# Patient Record
Sex: MALE | Race: WHITE | NOT HISPANIC OR LATINO | Employment: STUDENT | URBAN - METROPOLITAN AREA
[De-identification: names, ages, dates, MRNs, and addresses within clinical notes are randomized per-mention and may not be internally consistent; named-entity substitution may affect disease eponyms.]

---
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
## 2024-03-14 ENCOUNTER — OFFICE VISIT (OUTPATIENT)
Dept: ALLERGY | Facility: CLINIC | Age: 19
End: 2024-03-14
Payer: COMMERCIAL

## 2024-03-14 VITALS
SYSTOLIC BLOOD PRESSURE: 132 MMHG | HEART RATE: 73 BPM | BODY MASS INDEX: 28.6 KG/M2 | HEIGHT: 69 IN | DIASTOLIC BLOOD PRESSURE: 60 MMHG | OXYGEN SATURATION: 96 % | WEIGHT: 193.13 LBS

## 2024-03-14 DIAGNOSIS — J45.909 ASTHMA, UNSPECIFIED ASTHMA SEVERITY, UNSPECIFIED WHETHER COMPLICATED, UNSPECIFIED WHETHER PERSISTENT: ICD-10-CM

## 2024-03-14 DIAGNOSIS — L20.9 ATOPIC DERMATITIS, UNSPECIFIED TYPE: Primary | ICD-10-CM

## 2024-03-14 DIAGNOSIS — J30.9 ALLERGIC RHINITIS, UNSPECIFIED SEASONALITY, UNSPECIFIED TRIGGER: ICD-10-CM

## 2024-03-14 PROCEDURE — 99999 PR PBB SHADOW E&M-EST. PATIENT-LVL III: CPT | Mod: PBBFAC,,, | Performed by: STUDENT IN AN ORGANIZED HEALTH CARE EDUCATION/TRAINING PROGRAM

## 2024-03-14 PROCEDURE — 3078F DIAST BP <80 MM HG: CPT | Mod: CPTII,S$GLB,, | Performed by: STUDENT IN AN ORGANIZED HEALTH CARE EDUCATION/TRAINING PROGRAM

## 2024-03-14 PROCEDURE — 3075F SYST BP GE 130 - 139MM HG: CPT | Mod: CPTII,S$GLB,, | Performed by: STUDENT IN AN ORGANIZED HEALTH CARE EDUCATION/TRAINING PROGRAM

## 2024-03-14 PROCEDURE — 1159F MED LIST DOCD IN RCRD: CPT | Mod: CPTII,S$GLB,, | Performed by: STUDENT IN AN ORGANIZED HEALTH CARE EDUCATION/TRAINING PROGRAM

## 2024-03-14 PROCEDURE — 3008F BODY MASS INDEX DOCD: CPT | Mod: CPTII,S$GLB,, | Performed by: STUDENT IN AN ORGANIZED HEALTH CARE EDUCATION/TRAINING PROGRAM

## 2024-03-14 PROCEDURE — 99215 OFFICE O/P EST HI 40 MIN: CPT | Mod: S$GLB,,, | Performed by: STUDENT IN AN ORGANIZED HEALTH CARE EDUCATION/TRAINING PROGRAM

## 2024-03-14 PROCEDURE — 1160F RVW MEDS BY RX/DR IN RCRD: CPT | Mod: CPTII,S$GLB,, | Performed by: STUDENT IN AN ORGANIZED HEALTH CARE EDUCATION/TRAINING PROGRAM

## 2024-03-14 RX ORDER — HYDROCORTISONE 25 MG/G
CREAM TOPICAL 2 TIMES DAILY
Qty: 28 G | Refills: 3 | Status: SHIPPED | OUTPATIENT
Start: 2024-03-14

## 2024-03-14 RX ORDER — TRIAMCINOLONE ACETONIDE 1 MG/G
CREAM TOPICAL
Qty: 453.6 G | Refills: 2 | Status: SHIPPED | OUTPATIENT
Start: 2024-03-14

## 2024-03-14 RX ORDER — CRISABOROLE 20 MG/G
OINTMENT TOPICAL
Qty: 60 G | Refills: 5 | Status: SHIPPED | OUTPATIENT
Start: 2024-03-14

## 2024-03-14 RX ORDER — TRIAMCINOLONE ACETONIDE 1 MG/G
CREAM TOPICAL
Qty: 80 G | Refills: 5 | Status: SHIPPED | OUTPATIENT
Start: 2024-03-14 | End: 2024-03-14

## 2024-03-14 NOTE — PROGRESS NOTES
ALLERGY & IMMUNOLOGY CLINIC - FOLLOW UP     HISTORY OF PRESENT ILLNESS     Patient ID: Paulie Espinal is a 18 y.o. male    CC: atopic dermatitis, asthma    HPI: Paulie Espinal is a 18 y.o. male with a history of autism and allergic rhinitis, following up for asthma and atopic dermatitis.     His eczema is not under control recently. He says there are times when it is better than others, but its been worse than usual over the past 6 months. Flares can occur anywhere but are worse on his face, neck, hands, and arms. Very dry and itchy. On the face, it feels like a sunburn.  He is using gentle cleansers. Eucerin and cerave for moisturizing. He is using triamcinolone prn, which helps when he uses it. He is using it about every 4-5 days. He likes the triamcinolone cream more than the ointment.     Asthma is doing well on the generic air duo. He says he hasn't used the albuterol in the past year.     He says allergies are mostly controlled. Can get a stuffy nose, but not overly bothersome. He is taking cetirizine nightly.   He hasn't felt the need to use his nasal sprays recently.      MEDICAL HISTORY     Vaccines:    Immunization History   Administered Date(s) Administered    DTaP 02/21/2005, 04/28/2005, 06/30/2005, 03/21/2006, 11/04/2009    HIB 02/21/2005, 04/28/2005, 06/30/2005, 01/03/2006    HPV 9-Valent 01/26/2017, 09/01/2017    Hepatitis A, Pediatric/Adolescent, 2 Dose 05/27/2015, 01/26/2017    Hepatitis B 01/04/2005, 02/21/2005, 06/30/2005    IPV 02/21/2005, 04/28/2005, 06/30/2005, 03/21/2006, 11/04/2009    Influenza 11/03/2005, 11/17/2006, 11/12/2007, 11/05/2008, 12/10/2009    Influenza - Quadrivalent 10/21/2017    Influenza - Quadrivalent - PF *Preferred* (6 months and older) 12/02/2015, 01/26/2017, 10/21/2017, 09/25/2019, 09/21/2020    MMR 01/03/2006, 11/04/2009    Meningococcal Conjugate (MCV4P) 05/27/2016, 08/03/2021    Pneumococcal Conjugate - 7 Valent 02/21/2005, 04/28/2005, 06/30/2005, 01/03/2006    Tdap  01/26/2017    Varicella 06/21/2006, 11/04/2009     Medical Hx:   Patient Active Problem List   Diagnosis    Mild intermittent asthma without complication    Autism spectrum disorder, level one    Attention deficit hyperactivity disorder (ADHD), combined type    Overweight (BMI 25.0-29.9)     Surgical Hx:   Past Surgical History:   Procedure Laterality Date    CIRCUMCISION, PRIMARY      WISDOM TOOTH EXTRACTION Bilateral      Medications:   Current Outpatient Medications on File Prior to Visit   Medication Sig Dispense Refill    fluocinonide (LIDEX) 0.05 % external solution AAA scalp qday - bid prn pruritus 60 mL 3    ketoconazole (NIZORAL) 2 % shampoo Wash hair with medicated shampoo at least 2x/week - let sit on scalp at least 5 minutes prior to rinsing 120 mL 5    triamcinolone acetonide 0.1% (KENALOG) 0.1 % cream Apply topically 2 (two) times daily. 80 g 2    triamcinolone acetonide 0.1% (KENALOG) 0.1 % ointment Apply twice daily as needed to eczema flares for up to 10 days at a time. Avoid use on face. 80 g 5    albuterol (PROVENTIL/VENTOLIN HFA) 90 mcg/actuation inhaler Inhale 1 puff into the lungs every evening. (Patient not taking: Reported on 3/14/2024) 18 g 0    azelastine 205.5 mcg (0.15 %) Spry 2 sprays by Each Nostril route once daily.       fluticasone propion-salmeteroL 113-14 mcg/actuation AePB Inhale 1 puff into the lungs 2 (two) times daily.      fluticasone propionate (FLONASE) 50 mcg/actuation nasal spray 1 spray by Each Nostril route once daily.       No current facility-administered medications on file prior to visit.     Drug Allergies:   Review of patient's allergies indicates:   Allergen Reactions    Singulair  [montelukast]      Other reaction(s): weird behavioral issues     Social Hx:   Social History     Tobacco Use    Smoking status: Never     Passive exposure: Past    Smokeless tobacco: Never   Substance Use Topics    Alcohol use: No    Drug use: No     Additional History Obtained at  "Initial Visit:  H/o Asthma: endorses  H/o Rhinitis: endorses  Env/Occ:   Pets: 5 dogs and 3 cats. 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  Infection Hx: Denies frequent infections requiring antibiotics.      PHYSICAL EXAM     VS: /60 (BP Location: Left arm, Patient Position: Sitting, BP Method: Medium (Manual))   Pulse 73   Ht 5' 9" (1.753 m)   Wt 87.6 kg (193 lb 2 oz)   SpO2 96%   BMI 28.52 kg/m²   GENERAL: Alert, NAD, well-appearing  EYES: EOMI, no conjunctival injection, no discharge, no infraorbital shiners  NOSE: NT 2 + B/L, no stringing mucus, no polyps visualized  ORAL: MMM, no ulcers, no thrush  LUNGS: CTAB, no w/r/c, no increased WOB  HEART: RRR, normal S1/S2, no m/g/r  DERM: Erythema and xerosis with some scaling covering majority of face and neck; large patches of erythema, xerosis, and scaling covering at least half the surface area of his arms; excoriations on upper arms; erythema, xerosis, and scabs on dorsum of bilateral hands; scaling on palms of bilateral hands with fissure on right thenar eminence   NEURO: normal speech, normal gait, no facial asymmetry     LABORATORY STUDIES     Component      Latest Ref Rng & Units 11/27/2021 5/30/2015 5/28/2015   WBC      4.50 - 13.50 K/uL 7.95 8.74 10.15   RBC      4.50 - 5.30 M/uL 5.39 (H) 4.90 5.09   Hemoglobin      13.0 - 16.0 g/dL 15.9 13.3 13.6   Hematocrit      37.0 - 47.0 % 50.2 (H) 40.1 40.0   MCV      78 - 98 fL 93 82 79   MCH      25.0 - 35.0 pg 29.5 27.1 26.7   MCHC      31.0 - 37.0 g/dL 31.7 33.2 34.0   RDW      11.5 - 14.5 % 12.9 13.5 13.6   Platelets      150 - 450 K/uL 250 329 85 (L)   MPV      9.2 - 12.9 fL 10.0 9.4 10.3   Immature Granulocytes      0.0 - 0.5 % 0.4     Gran # (ANC)      1.8 - 8.0 K/uL 4.3 4.3 4.5   Immature Grans (Abs)      0.00 - " 0.04 K/uL 0.03     Lymph #      1.2 - 5.8 K/uL 2.6 2.5 3.5   Mono #      0.2 - 0.8 K/uL 0.8 0.9 (H) 1.0 (H)   Eos #      0.0 - 0.4 K/uL 0.2 1.0 (H) 1.1 (H)   Baso #      0.01 - 0.05 K/uL 0.04 0.05 0.06   Differential Method       Automated Automated Automated   Sodium      136 - 145 mmol/L 140  139   Potassium      3.5 - 5.1 mmol/L 4.3  4.1   Chloride      95 - 110 mmol/L 103  108   CO2      23 - 29 mmol/L 27  21 (L)   Glucose      70 - 110 mg/dL 95  85   BUN      5 - 18 mg/dL 10  15   Creatinine      0.5 - 1.4 mg/dL 0.8  0.6   Calcium      8.7 - 10.5 mg/dL 10.2  9.7   PROTEIN TOTAL      6.0 - 8.4 g/dL 7.5  7.6   Albumin      3.2 - 4.7 g/dL 4.1  3.9   BILIRUBIN TOTAL      0.1 - 1.0 mg/dL 0.5  0.2   Alkaline Phosphatase      89 - 365 U/L 88 (L)  243   AST      10 - 40 U/L 14  24   ALT      10 - 44 U/L 25  32   Hemoglobin A1C External      4.0 - 5.6 % 5.1     TSH      0.400 - 5.000 uIU/mL   0.448   HIV 1/2 Ag/Ab      Negative Negative        ALLERGEN TESTING     Skin Prick:   Records previously requested, but not received.      PULMONARY FUNCTION TESTING     Date 11/20/23:  FVC:         108%ref -> - 1%  FEV1:         105%ref -> + 1%  FEV1/FVC: 83%  FEF 25-75: 99%ref  Interpretation: Spirometry is normal. Spirometry remains unimproved following bronchodilator.      IMAGING & OTHER DIAGNOSTICS     CXR 3/20/11:  Clinical history: Shortness of breath   The heart and mediastinum structures are within normal limits.   Prominent lung markings are seen at the parahilar regions bilaterally.     There is no evidence of focal infiltration.  There is no evidence of   pleural effusion or pneumothorax.  The bones appear intact.   Impression: Bronchitis suspected.      ASSESSMENT & PLAN     Paulie Espinal is a 18 y.o. male with     # Severe atopic dermatitis: Symptoms have been poorly controlled over the past 6 months. Flares can occur anywhere but are worst on his face, neck, hands, and arms. He is using triamcinolone sparingly  (about once every 4-5 days). Of note, he generally prefers creams over ointments.  -continue triamcinolone 0.1% cream BID prn. Recommend that he use this more frequently. Discussed the balance between using it when needed vs over use.  -start hydrocortisone 2.5 cream BID prn for flares on face. Counseled against over use.  -start eucrisa ointment BID prn for flares on face or body.   -continue frequent moisturization.  -if symptoms still not controlled, will further discuss the option of dupixent at next visit.     # Asthma: Symptoms under good control, and he hasn't needed albuterol since restarting airduo in 6/2023. He didn't tolerate montelukast as a child (behavioral issus), so will continue to avoid. Spirometry was normal (on airduo).  -continue generic airduo 113-14 mcg 1 puff bid.  -continue albuterol prn.    # Allergic rhinitis: He was on allergen immunotherapy injections for about 6 years (~9016-0908), prescribed through his allergist in [place], but given to him by his mother at home. He found AIT helpful (symptoms now more intermittent and easy to control).  -continue cetirizine nightly.   -he has flonase and azelastine nasal sprays to use prn (but hasn't needed them recently).    Follow up: 1-2 months     I spent a total of 45 minutes on the day of the visit.  This includes face to face time and non-face to face time preparing to see the patient, obtaining and/or reviewing separately obtained history, documenting clinical information in the electronic or other health record.    Ciara Kennedy MD  Allergy/Immunology

## 2024-05-02 ENCOUNTER — OFFICE VISIT (OUTPATIENT)
Dept: ALLERGY | Facility: CLINIC | Age: 19
End: 2024-05-02
Payer: COMMERCIAL

## 2024-05-02 VITALS — HEIGHT: 69 IN | BODY MASS INDEX: 29.36 KG/M2 | WEIGHT: 198.19 LBS

## 2024-05-02 DIAGNOSIS — L20.9 ATOPIC DERMATITIS, UNSPECIFIED TYPE: Primary | ICD-10-CM

## 2024-05-02 DIAGNOSIS — J45.909 ASTHMA, UNSPECIFIED ASTHMA SEVERITY, UNSPECIFIED WHETHER COMPLICATED, UNSPECIFIED WHETHER PERSISTENT: ICD-10-CM

## 2024-05-02 DIAGNOSIS — J30.9 ALLERGIC RHINITIS, UNSPECIFIED SEASONALITY, UNSPECIFIED TRIGGER: ICD-10-CM

## 2024-05-02 PROCEDURE — 99999 PR PBB SHADOW E&M-EST. PATIENT-LVL III: CPT | Mod: PBBFAC,,, | Performed by: STUDENT IN AN ORGANIZED HEALTH CARE EDUCATION/TRAINING PROGRAM

## 2024-05-02 PROCEDURE — 99214 OFFICE O/P EST MOD 30 MIN: CPT | Mod: S$GLB,,, | Performed by: STUDENT IN AN ORGANIZED HEALTH CARE EDUCATION/TRAINING PROGRAM

## 2024-05-02 PROCEDURE — 1159F MED LIST DOCD IN RCRD: CPT | Mod: CPTII,S$GLB,, | Performed by: STUDENT IN AN ORGANIZED HEALTH CARE EDUCATION/TRAINING PROGRAM

## 2024-05-02 PROCEDURE — 3008F BODY MASS INDEX DOCD: CPT | Mod: CPTII,S$GLB,, | Performed by: STUDENT IN AN ORGANIZED HEALTH CARE EDUCATION/TRAINING PROGRAM

## 2024-05-02 PROCEDURE — 1160F RVW MEDS BY RX/DR IN RCRD: CPT | Mod: CPTII,S$GLB,, | Performed by: STUDENT IN AN ORGANIZED HEALTH CARE EDUCATION/TRAINING PROGRAM

## 2024-05-02 RX ORDER — ALBUTEROL SULFATE 90 UG/1
1 AEROSOL, METERED RESPIRATORY (INHALATION) EVERY 6 HOURS PRN
Qty: 18 G | Refills: 2 | Status: SHIPPED | OUTPATIENT
Start: 2024-05-02

## 2024-05-02 NOTE — PROGRESS NOTES
ALLERGY & IMMUNOLOGY CLINIC - FOLLOW UP     HISTORY OF PRESENT ILLNESS     Patient ID: Paulie Espinal is a 18 y.o. male    CC: atopic dermatitis, asthma     HPI: Paulie Espinal is a 18 y.o. male with a history of autism and allergic rhinitis, following up for asthma and atopic dermatitis.     He says after last visit, started to use triamcinolone more consistently for about 1.5 weeks, which helped a lot. Symptoms then returned, but not as bad as last visit. Still pretty severe on his hands though. He is washing his hands pretty frequently at work.   He says he didn't get a chance to  the eucrisa, but he thinks the pharmacy did fill it.   He currently doesn't feel like he needs to start dupixent.     He reports his asthma is doing fine. Using his airduo. Hasn't been needing albuterol.     He says his rhinitis is also under control. Using zyrtec nightly. Hasn't really been needing his nasal sprays.      MEDICAL HISTORY     Vaccines:    Immunization History   Administered Date(s) Administered    DTaP 02/21/2005, 04/28/2005, 06/30/2005, 03/21/2006, 11/04/2009    HIB 02/21/2005, 04/28/2005, 06/30/2005, 01/03/2006    HPV 9-Valent 01/26/2017, 09/01/2017    Hepatitis A, Pediatric/Adolescent, 2 Dose 05/27/2015, 01/26/2017    Hepatitis B 01/04/2005, 02/21/2005, 06/30/2005    IPV 02/21/2005, 04/28/2005, 06/30/2005, 03/21/2006, 11/04/2009    Influenza 11/03/2005, 11/17/2006, 11/12/2007, 11/05/2008, 12/10/2009    Influenza - Quadrivalent 10/21/2017    Influenza - Quadrivalent - PF *Preferred* (6 months and older) 12/02/2015, 01/26/2017, 10/21/2017, 09/25/2019, 09/21/2020    MMR 01/03/2006, 11/04/2009    Meningococcal Conjugate (MCV4P) 05/27/2016, 08/03/2021    Pneumococcal Conjugate - 7 Valent 02/21/2005, 04/28/2005, 06/30/2005, 01/03/2006    Tdap 01/26/2017    Varicella 06/21/2006, 11/04/2009     Medical Hx:   Patient Active Problem List   Diagnosis    Mild intermittent asthma without complication    Autism spectrum  disorder, level one    Attention deficit hyperactivity disorder (ADHD), combined type    Overweight (BMI 25.0-29.9)     Surgical Hx:   Past Surgical History:   Procedure Laterality Date    CIRCUMCISION, PRIMARY      WISDOM TOOTH EXTRACTION Bilateral      Medications:   Current Outpatient Medications on File Prior to Visit   Medication Sig Dispense Refill    albuterol (PROVENTIL/VENTOLIN HFA) 90 mcg/actuation inhaler Inhale 1 puff into the lungs every evening. 18 g 0    azelastine 205.5 mcg (0.15 %) Spry 2 sprays by Each Nostril route once daily.       crisaborole (EUCRISA) 2 % Oint Apply twice daily as needed to areas of eczema flares. 60 g 5    fluocinonide (LIDEX) 0.05 % external solution AAA scalp qday - bid prn pruritus 60 mL 3    fluticasone propionate (FLONASE) 50 mcg/actuation nasal spray 1 spray by Each Nostril route once daily.      hydrocortisone 2.5 % cream Apply topically 2 (two) times daily. Apply twice daily as needed to eczema flares for up to 10 days at a time. 28 g 3    ketoconazole (NIZORAL) 2 % shampoo Wash hair with medicated shampoo at least 2x/week - let sit on scalp at least 5 minutes prior to rinsing 120 mL 5    triamcinolone acetonide 0.1% (KENALOG) 0.1 % cream Apply twice daily as needed to eczema flares for up to 10 days at a time. Avoid use on face. 453.6 g 2    triamcinolone acetonide 0.1% (KENALOG) 0.1 % ointment Apply twice daily as needed to eczema flares for up to 10 days at a time. Avoid use on face. 80 g 5    [DISCONTINUED] fluticasone propion-salmeteroL 113-14 mcg/actuation AePB Inhale 1 puff into the lungs 2 (two) times daily.       No current facility-administered medications on file prior to visit.     Drug Allergies:   Review of patient's allergies indicates:   Allergen Reactions    Singulair  [montelukast]      Other reaction(s): weird behavioral issues     Social Hx:   Social History     Tobacco Use    Smoking status: Never     Passive exposure: Past    Smokeless tobacco:  "Never   Substance Use Topics    Alcohol use: No    Drug use: No     Additional History Obtained at Initial Visit:  H/o Asthma: endorses  H/o Rhinitis: endorses  Env/Occ:   Pets: 5 dogs and 3 cats. 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  Infection Hx: Denies frequent infections requiring antibiotics.      PHYSICAL EXAM     VS: Ht 5' 9" (1.753 m)   Wt 89.9 kg (198 lb 3.1 oz)   BMI 29.27 kg/m²   GENERAL: Alert, NAD, well-appearing  EYES: EOMI, no conjunctival injection, no discharge, no infraorbital shiners  NOSE: NT 2 + B/L, no stringing mucus, no polyps visualized  ORAL: MMM, no ulcers, no thrush  LUNGS: CTAB, no w/r/c, no increased WOB  HEART: RRR, normal S1/S2, no m/g/r  DERM: Scaling and erythema on palms on bilateral hands, but more so on right with skin breaks and fissures on palm of right hand and fingers; mild xerosis on upper arms bilaterally.  NEURO: normal speech, normal gait, no facial asymmetry     LABORATORY STUDIES     Component      Latest Ref Rng & Units 11/27/2021 5/30/2015 5/28/2015   WBC      4.50 - 13.50 K/uL 7.95 8.74 10.15   RBC      4.50 - 5.30 M/uL 5.39 (H) 4.90 5.09   Hemoglobin      13.0 - 16.0 g/dL 15.9 13.3 13.6   Hematocrit      37.0 - 47.0 % 50.2 (H) 40.1 40.0   MCV      78 - 98 fL 93 82 79   MCH      25.0 - 35.0 pg 29.5 27.1 26.7   MCHC      31.0 - 37.0 g/dL 31.7 33.2 34.0   RDW      11.5 - 14.5 % 12.9 13.5 13.6   Platelets      150 - 450 K/uL 250 329 85 (L)   MPV      9.2 - 12.9 fL 10.0 9.4 10.3   Immature Granulocytes      0.0 - 0.5 % 0.4     Gran # (ANC)      1.8 - 8.0 K/uL 4.3 4.3 4.5   Immature Grans (Abs)      0.00 - 0.04 K/uL 0.03     Lymph #      1.2 - 5.8 K/uL 2.6 2.5 3.5   Mono #      0.2 - 0.8 K/uL 0.8 0.9 (H) 1.0 (H)   Eos #      0.0 - 0.4 K/uL 0.2 1.0 (H) 1.1 (H)   Baso #      0.01 - " 0.05 K/uL 0.04 0.05 0.06   Differential Method       Automated Automated Automated   Sodium      136 - 145 mmol/L 140  139   Potassium      3.5 - 5.1 mmol/L 4.3  4.1   Chloride      95 - 110 mmol/L 103  108   CO2      23 - 29 mmol/L 27  21 (L)   Glucose      70 - 110 mg/dL 95  85   BUN      5 - 18 mg/dL 10  15   Creatinine      0.5 - 1.4 mg/dL 0.8  0.6   Calcium      8.7 - 10.5 mg/dL 10.2  9.7   PROTEIN TOTAL      6.0 - 8.4 g/dL 7.5  7.6   Albumin      3.2 - 4.7 g/dL 4.1  3.9   BILIRUBIN TOTAL      0.1 - 1.0 mg/dL 0.5  0.2   Alkaline Phosphatase      89 - 365 U/L 88 (L)  243   AST      10 - 40 U/L 14  24   ALT      10 - 44 U/L 25  32   Hemoglobin A1C External      4.0 - 5.6 % 5.1     TSH      0.400 - 5.000 uIU/mL   0.448   HIV 1/2 Ag/Ab      Negative Negative        ALLERGEN TESTING     Skin Prick:   Records previously requested, but not received.      PULMONARY FUNCTION TESTING     Date 11/20/23:  FVC:         108%ref -> - 1%  FEV1:         105%ref -> + 1%  FEV1/FVC: 83%  FEF 25-75: 99%ref  Interpretation: Spirometry is normal. Spirometry remains unimproved following bronchodilator.      IMAGING & OTHER DIAGNOSTICS     CXR 3/20/11:  Clinical history: Shortness of breath   The heart and mediastinum structures are within normal limits.   Prominent lung markings are seen at the parahilar regions bilaterally.     There is no evidence of focal infiltration.  There is no evidence of   pleural effusion or pneumothorax.  The bones appear intact.   Impression: Bronchitis suspected.      ASSESSMENT & PLAN     Paulie Espinal is a 18 y.o. male with     # Atopic dermatitis: At visit in 3/2024, symptoms were severe and poorly controlled. He had improvement with increasing use of his triamcinolone, but currently with significant symptoms on his hands. Says he washes his hands a lot at work. Of note, he generally prefers creams over ointments.  -continue triamcinolone 0.1% cream BID prn.   -start eucrisa ointment BID prn for  flares on face or body (says he hasn't gotten a chance to pick this up from pharmacy yet).    -continue frequent moisturization.  -for his hands, recommend he use the triamcinolone at least daily on his hands for 1-2 weeks then prn after that. Advised that he always moisturize his hands with an unscented thick cream or ointment after washing them.   -if symptoms worsen again, will further discuss the option of dupixent.     # Asthma: Symptoms under good control, and he hasn't needed albuterol since restarting airduo in 6/2023. He didn't tolerate montelukast as a child (behavioral issus), so will continue to avoid. Spirometry was normal (on airduo).  -continue generic airduo 113-14 mcg 1 puff bid.  -continue albuterol prn.    # Allergic rhinitis: He was on allergen immunotherapy injections for about 6 years (~[id number]), prescribed through his allergist in [place], but given to him by his mother at home. He found AIT helpful (symptoms now more intermittent and easy to control).  -continue cetirizine nightly.   -he has flonase and azelastine nasal sprays to use prn (but hasn't needed them recently).      Follow up: 6 months or sooner if needed    I spent a total of 35 minutes on the day of the visit.  This includes face to face time and non-face to face time preparing to see the patient, obtaining and/or reviewing separately obtained history, documenting clinical information in the electronic or other health record.    Ciara Kennedy MD  Allergy/Immunology

## 2024-05-08 RX ORDER — FLUTICASONE PROPIONATE AND SALMETEROL 113; 14 UG/1; UG/1
1 POWDER, METERED RESPIRATORY (INHALATION) 2 TIMES DAILY
Qty: 1 EACH | Refills: 11 | Status: SHIPPED | OUTPATIENT
Start: 2024-05-08 | End: 2024-05-08

## 2024-06-13 ENCOUNTER — OFFICE VISIT (OUTPATIENT)
Dept: OPTOMETRY | Facility: CLINIC | Age: 19
End: 2024-06-13
Payer: COMMERCIAL

## 2024-06-13 DIAGNOSIS — H52.13 MYOPIA OF BOTH EYES: Primary | ICD-10-CM

## 2024-06-13 PROCEDURE — 92015 DETERMINE REFRACTIVE STATE: CPT | Mod: S$GLB,,, | Performed by: OPTOMETRIST

## 2024-06-13 PROCEDURE — 99999 PR PBB SHADOW E&M-EST. PATIENT-LVL II: CPT | Mod: PBBFAC,,, | Performed by: OPTOMETRIST

## 2024-06-13 PROCEDURE — 1159F MED LIST DOCD IN RCRD: CPT | Mod: CPTII,S$GLB,, | Performed by: OPTOMETRIST

## 2024-06-13 PROCEDURE — 92014 COMPRE OPH EXAM EST PT 1/>: CPT | Mod: S$GLB,,, | Performed by: OPTOMETRIST

## 2024-06-13 NOTE — PROGRESS NOTES
HPI    Patient is here today for a routine eye exam. Last seen on 12/27/2022. No   visual complaints at this time. Patient states he sees floaters when he   looks up in the [place]orian. No pain or discomfort. Itching OU associated with   seasonal allergies.    Eye Meds: None  Past Ocular Sx: None  Last edited by Do Daniel on 6/13/2024  2:34 PM.            Assessment /Plan     For exam results, see Encounter Report.    Myopia of both eyes  Eyeglass Final Rx       Eyeglass Final Rx         Sphere [employer]ylinder [place] [facility]    Right -5.25 +1.00 095 20/20    Left -5.25 +0.50 125 20/20      Type: SVL    Expiration Date: 6/13/2025                      RTC 1 yr

## 2024-10-26 ENCOUNTER — PATIENT MESSAGE (OUTPATIENT)
Dept: ALLERGY | Facility: CLINIC | Age: 19
End: 2024-10-26
Payer: COMMERCIAL

## 2024-10-28 NOTE — TELEPHONE ENCOUNTER
Spoke with Pharmacy Tech at [facility] and was informed that Airduo is not covered and alternatives are Advair HFA or Breo Elipta.

## 2024-11-07 ENCOUNTER — OFFICE VISIT (OUTPATIENT)
Dept: ALLERGY | Facility: CLINIC | Age: 19
End: 2024-11-07
Payer: COMMERCIAL

## 2024-11-07 VITALS — WEIGHT: 202.19 LBS | HEIGHT: 69 IN | BODY MASS INDEX: 29.95 KG/M2

## 2024-11-07 DIAGNOSIS — L20.9 ATOPIC DERMATITIS, UNSPECIFIED TYPE: Primary | ICD-10-CM

## 2024-11-07 DIAGNOSIS — J30.9 ALLERGIC RHINITIS, UNSPECIFIED SEASONALITY, UNSPECIFIED TRIGGER: ICD-10-CM

## 2024-11-07 DIAGNOSIS — J45.909 ASTHMA, UNSPECIFIED ASTHMA SEVERITY, UNSPECIFIED WHETHER COMPLICATED, UNSPECIFIED WHETHER PERSISTENT: ICD-10-CM

## 2024-11-07 PROCEDURE — 99999 PR PBB SHADOW E&M-EST. PATIENT-LVL III: CPT | Mod: PBBFAC,,, | Performed by: STUDENT IN AN ORGANIZED HEALTH CARE EDUCATION/TRAINING PROGRAM

## 2024-11-07 RX ORDER — CRISABOROLE 20 MG/G
OINTMENT TOPICAL
Qty: 60 G | Refills: 5 | Status: SHIPPED | OUTPATIENT
Start: 2024-11-07

## 2024-11-07 NOTE — PROGRESS NOTES
ALLERGY & IMMUNOLOGY CLINIC - FOLLOW UP     HISTORY OF PRESENT ILLNESS     Patient ID: Paulie Espinal is a 18 y.o. male    CC: atopic dermatitis, asthma     HPI: Paulie Espinal is a 18 y.o. male with a history of autism and allergic rhinitis, following up for asthma and atopic dermatitis.     He reports his asthma is well controlled. He takes his airduo, only needing once per day. He hasn't needed albuterol recently.     He says eczema is better than it was in March, but still with bothersome symptoms. Gets symptoms on his hands, arms, face, and neck.   He tried to get the eucrisa ointment, but says he never got [facility] to fill it. Now using [facility].  He is using the triamcinolone as needed.  He is moisturizing hands after hand washing.     He reports his rhinitis is doing about the same, not too bad. He is no longer taking zyrtec nightly. He didn't think it was helping. He takes benadryl prn on occasion. He thinks it works better than the zyrtec.   He hasn't been using his nasal sprays.      MEDICAL HISTORY     Vaccines:    Immunization History   Administered Date(s) Administered    DTaP 02/21/2005, 04/28/2005, 06/30/2005, 03/21/2006, 11/04/2009    HIB 02/21/2005, 04/28/2005, 06/30/2005, 01/03/2006    HPV 9-Valent 01/26/2017, 09/01/2017    Hepatitis A, Pediatric/Adolescent, 2 Dose 05/27/2015, 01/26/2017    Hepatitis B 01/04/2005, 02/21/2005, 06/30/2005    IPV 02/21/2005, 04/28/2005, 06/30/2005, 03/21/2006, 11/04/2009    Influenza 11/03/2005, 11/17/2006, 11/12/2007, 11/05/2008, 12/10/2009    Influenza - Quadrivalent 10/21/2017    Influenza - Quadrivalent - PF *Preferred* (6 months and older) 12/02/2015, 01/26/2017, 10/21/2017, 09/25/2019, 09/21/2020    MMR 01/03/2006, 11/04/2009    Meningococcal Conjugate (MCV4P) 05/27/2016, 08/03/2021    Pneumococcal Conjugate - 7 Valent 02/21/2005, 04/28/2005, 06/30/2005, 01/03/2006    Tdap 01/26/2017    Varicella 06/21/2006, 11/04/2009     Medical Hx:   Patient Active Problem List    Diagnosis    Mild intermittent asthma without complication    Autism spectrum disorder, level one    Attention deficit hyperactivity disorder (ADHD), combined type    Overweight (BMI 25.0-29.9)     Surgical Hx:   Past Surgical History:   Procedure Laterality Date    CIRCUMCISION, PRIMARY      WISDOM TOOTH EXTRACTION Bilateral      Medications:   Current Outpatient Medications on File Prior to Visit   Medication Sig Dispense Refill    albuterol (PROVENTIL/VENTOLIN HFA) 90 mcg/actuation inhaler Inhale 1 puff into the lungs every 6 (six) hours as needed for Wheezing or Shortness of Breath. 18 g 2    crisaborole (EUCRISA) 2 % Oint Apply twice daily as needed to areas of eczema flares. 60 g 5    fluticasone propion-salmeteroL 113 mcg-14 mcg/actuation aebs Inhale 1 puff into the lungs 2 (two) times daily. 1 each 11    hydrocortisone 2.5 % cream Apply topically 2 (two) times daily. Apply twice daily as needed to eczema flares for up to 10 days at a time. 28 g 3    triamcinolone acetonide 0.1% (KENALOG) 0.1 % cream Apply twice daily as needed to eczema flares for up to 10 days at a time. Avoid use on face. 453.6 g 2    triamcinolone acetonide 0.1% (KENALOG) 0.1 % ointment Apply twice daily as needed to eczema flares for up to 10 days at a time. Avoid use on face. 80 g 5    azelastine 205.5 mcg (0.15 %) Spry 2 sprays by Each Nostril route once daily.  (Patient not taking: Reported on 11/7/2024)      fluocinonide (LIDEX) 0.05 % external solution AAA scalp qday - bid prn pruritus (Patient not taking: Reported on 11/7/2024) 60 mL 3    fluticasone propionate (FLONASE) 50 mcg/actuation nasal spray 1 spray by Each Nostril route once daily. (Patient not taking: Reported on 11/7/2024)      ketoconazole (NIZORAL) 2 % shampoo Wash hair with medicated shampoo at least 2x/week - let sit on scalp at least 5 minutes prior to rinsing (Patient not taking: Reported on 11/7/2024) 120 mL 5     No current facility-administered medications on  "file prior to visit.     Drug Allergies:   Review of patient's allergies indicates:   Allergen Reactions    Singulair  [montelukast]      Other reaction(s): weird behavioral issues     Social Hx:   Social History     Tobacco Use    Smoking status: Never     Passive exposure: Past    Smokeless tobacco: Never   Substance Use Topics    Alcohol use: No    Drug use: No     Additional History Obtained at Initial Visit:  H/o Asthma: endorses  H/o Rhinitis: endorses  Env/Occ:   Pets: 5 dogs and 3 cats. They used to trigger symptoms (prior to allergy shots), but now they don't   Occupation: he works as a  in a grocery store. He is in senior year at [employer], plans on studying accounting (plans on staying in [place]).  Infection Hx: Denies frequent infections requiring antibiotics.      PHYSICAL EXAM     VS: Ht 5' 9" (1.753 m)   Wt 91.7 kg (202 lb 2.6 oz)   BMI 29.85 kg/m²   GENERAL: Alert, NAD, well-appearing  EYES: EOMI, no conjunctival injection, no discharge, no infraorbital shiners  NOSE: NT 2-3+ B/L, + white mucus in right nares, no polyps visualized  ORAL: MMM, no ulcers, no thrush  LUNGS: CTAB, no w/r/c, no increased WOB  HEART: RRR, normal S1/S2, no m/g/r  DERM: Scaling and erythema of bilateral hands with skin breaks and fissures on palms and fingers; excoriations on right forearm; erythema and scaling on right neck  NEURO: normal speech, normal gait, no facial asymmetry     LABORATORY STUDIES     Component      Latest Ref Rng & Units 11/27/2021 5/30/2015 5/28/2015   WBC      4.50 - 13.50 K/uL 7.95 8.74 10.15   RBC      4.50 - 5.30 M/uL 5.39 (H) 4.90 5.09   Hemoglobin      13.0 - 16.0 g/dL 15.9 13.3 13.6   Hematocrit      37.0 - 47.0 % 50.2 (H) 40.1 40.0   MCV      78 - 98 fL 93 82 79   MCH      25.0 - 35.0 pg 29.5 27.1 26.7   MCHC      31.0 - 37.0 g/dL 31.7 33.2 34.0   RDW      11.5 - 14.5 % 12.9 13.5 13.6   Platelets      150 - 450 K/uL 250 329 85 (L)   MPV      9.2 - 12.9 fL 10.0 9.4 10.3 "   Immature Granulocytes      0.0 - 0.5 % 0.4     Gran # (ANC)      1.8 - 8.0 K/uL 4.3 4.3 4.5   Immature Grans (Abs)      0.00 - 0.04 K/uL 0.03     Lymph #      1.2 - 5.8 K/uL 2.6 2.5 3.5   Mono #      0.2 - 0.8 K/uL 0.8 0.9 (H) 1.0 (H)   Eos #      0.0 - 0.4 K/uL 0.2 1.0 (H) 1.1 (H)   Baso #      0.01 - 0.05 K/uL 0.04 0.05 0.06   Differential Method       Automated Automated Automated   Sodium      136 - 145 mmol/L 140  139   Potassium      3.5 - 5.1 mmol/L 4.3  4.1   Chloride      95 - 110 mmol/L 103  108   CO2      23 - 29 mmol/L 27  21 (L)   Glucose      70 - 110 mg/dL 95  85   BUN      5 - 18 mg/dL 10  15   Creatinine      0.5 - 1.4 mg/dL 0.8  0.6   Calcium      8.7 - 10.5 mg/dL 10.2  9.7   PROTEIN TOTAL      6.0 - 8.4 g/dL 7.5  7.6   Albumin      3.2 - 4.7 g/dL 4.1  3.9   BILIRUBIN TOTAL      0.1 - 1.0 mg/dL 0.5  0.2   Alkaline Phosphatase      89 - 365 U/L 88 (L)  243   AST      10 - 40 U/L 14  24   ALT      10 - 44 U/L 25  32   Hemoglobin A1C External      4.0 - 5.6 % 5.1     TSH      0.400 - 5.000 uIU/mL   0.448   HIV 1/2 Ag/Ab      Negative Negative        ALLERGEN TESTING     Skin Prick:   Records previously requested, but not received.      PULMONARY FUNCTION TESTING     Date 11/20/23:  FVC:         108%ref -> - 1%  FEV1:         105%ref -> + 1%  FEV1/FVC: 83%  FEF 25-75: 99%ref  Interpretation: Spirometry is normal. Spirometry remains unimproved following bronchodilator.      IMAGING & OTHER DIAGNOSTICS     CXR 3/20/11:  Clinical history: Shortness of breath   The heart and mediastinum structures are within normal limits.   Prominent lung markings are seen at the parahilar regions bilaterally.     There is no evidence of focal infiltration.  There is no evidence of   pleural effusion or pneumothorax.  The bones appear intact.   Impression: Bronchitis suspected.      ASSESSMENT & PLAN     Paulie Espinal is a 18 y.o. male with     # Atopic dermatitis: At visit in 3/2024, symptoms were severe and poorly  controlled. He had improvement with increasing use of his triamcinolone, but still not optimally controlled. Symptoms on hands are severe. Of note, he generally prefers creams over ointments.  -continue triamcinolone 0.1% cream BID prn.   -start eucrisa ointment BID prn for flares on face or body (wasn't able to get it from old pharmacy; prescribed to his new pharmacy).    -continue frequent moisturization, and continue to moisturize hands after washing.   -if symptoms not controlled with addition of eucrisa, will further discuss the option of dupixent at next visit in 1-2 months.     # Asthma: Symptoms under good control, and he hasn't needed albuterol recently. He didn't tolerate montelukast as a child (behavioral issus), so will continue to avoid. Spirometry was normal (on airduo).  -continue generic airduo 113-14 mcg 1 puff daily (prescribed BID but he reports good control with once daily use).  -continue albuterol prn.    # Allergic rhinitis: He was on allergen immunotherapy injections for about 6 years (~[id number]), prescribed through his allergist in [place], but given to him by his mother at home. He found AIT helpful (symptoms now more intermittent and easy to control). He is no longer using flonase or azelastine nasal sprays. He stopped cetirizine because he didn't find it helpful, now using benadryl prn.  -advised that instead of benadryl, he can take higher dose of cetirizine (20 mg) prn.       Follow up: 1-2 months     I spent a total of 30 minutes on the day of the visit.  This includes face to face time and non-face to face time preparing to see the patient (eg, review of tests), obtaining and/or reviewing separately obtained history, documenting clinical information in the electronic or other health record.    Ciara Kennedy MD  Allergy/Immunology

## 2024-11-21 ENCOUNTER — TELEPHONE (OUTPATIENT)
Dept: FAMILY MEDICINE | Facility: CLINIC | Age: 19
End: 2024-11-21
Payer: COMMERCIAL

## 2024-11-21 ENCOUNTER — PATIENT MESSAGE (OUTPATIENT)
Dept: FAMILY MEDICINE | Facility: CLINIC | Age: 19
End: 2024-11-21
Payer: COMMERCIAL

## 2024-11-21 NOTE — TELEPHONE ENCOUNTER
Spoke with patient. Patient declines scheduling and appointment with NP. Patient scheduled for 01//08/24 with pcp.

## 2024-11-21 NOTE — TELEPHONE ENCOUNTER
----- Message from Radha Mays MD sent at 11/19/2024 11:52 AM CST -----  Patient needs to be seen in December of this year for annual - please schedule. Please address health maintenance, if applicable.

## 2025-01-08 ENCOUNTER — OFFICE VISIT (OUTPATIENT)
Dept: FAMILY MEDICINE | Facility: CLINIC | Age: 20
End: 2025-01-08
Payer: COMMERCIAL

## 2025-01-08 VITALS
WEIGHT: 216.19 LBS | SYSTOLIC BLOOD PRESSURE: 136 MMHG | HEART RATE: 74 BPM | BODY MASS INDEX: 32.02 KG/M2 | DIASTOLIC BLOOD PRESSURE: 64 MMHG | HEIGHT: 69 IN | TEMPERATURE: 98 F | OXYGEN SATURATION: 97 %

## 2025-01-08 DIAGNOSIS — F84.0 AUTISM SPECTRUM DISORDER: ICD-10-CM

## 2025-01-08 DIAGNOSIS — Z00.00 ROUTINE MEDICAL EXAM: Primary | ICD-10-CM

## 2025-01-08 DIAGNOSIS — Z23 NEED FOR STREPTOCOCCUS PNEUMONIAE VACCINATION: ICD-10-CM

## 2025-01-08 DIAGNOSIS — L30.9 ECZEMA, UNSPECIFIED TYPE: ICD-10-CM

## 2025-01-08 DIAGNOSIS — Z23 NEED FOR COVID-19 VACCINE: ICD-10-CM

## 2025-01-08 DIAGNOSIS — J45.20 MILD INTERMITTENT ASTHMA WITHOUT COMPLICATION: ICD-10-CM

## 2025-01-08 DIAGNOSIS — F90.2 ATTENTION DEFICIT HYPERACTIVITY DISORDER (ADHD), COMBINED TYPE: ICD-10-CM

## 2025-01-08 DIAGNOSIS — E66.3 OVERWEIGHT (BMI 25.0-29.9): ICD-10-CM

## 2025-01-08 DIAGNOSIS — Z23 FLU VACCINE NEED: ICD-10-CM

## 2025-01-08 PROCEDURE — 3008F BODY MASS INDEX DOCD: CPT | Mod: CPTII,S$GLB,, | Performed by: INTERNAL MEDICINE

## 2025-01-08 PROCEDURE — 99999 PR PBB SHADOW E&M-EST. PATIENT-LVL IV: CPT | Mod: PBBFAC,,, | Performed by: INTERNAL MEDICINE

## 2025-01-08 PROCEDURE — 1160F RVW MEDS BY RX/DR IN RCRD: CPT | Mod: CPTII,S$GLB,, | Performed by: INTERNAL MEDICINE

## 2025-01-08 PROCEDURE — 1159F MED LIST DOCD IN RCRD: CPT | Mod: CPTII,S$GLB,, | Performed by: INTERNAL MEDICINE

## 2025-01-08 PROCEDURE — 99395 PREV VISIT EST AGE 18-39: CPT | Mod: S$GLB,,, | Performed by: INTERNAL MEDICINE

## 2025-01-08 PROCEDURE — 3075F SYST BP GE 130 - 139MM HG: CPT | Mod: CPTII,S$GLB,, | Performed by: INTERNAL MEDICINE

## 2025-01-08 PROCEDURE — 3078F DIAST BP <80 MM HG: CPT | Mod: CPTII,S$GLB,, | Performed by: INTERNAL MEDICINE

## 2025-01-08 RX ORDER — HYDROCORTISONE 25 MG/G
CREAM TOPICAL 2 TIMES DAILY
Qty: 28 G | Refills: 3 | Status: SHIPPED | OUTPATIENT
Start: 2025-01-08

## 2025-01-08 NOTE — PROGRESS NOTES
CHIEF COMPLAINT:   Chief Complaint   Patient presents with    Annual Exam          HISTORY OF PRESENT ILLNESS:  Paulie Espinal is a 19 y.o. male who presents to the clinic today for a routine medical physical exam. His last physical exam was approximately 1 years(s) ago.          Patient reports ongoing eczema and dyshidrosis. He has found an effective treatment regimen involving Eucrisa, a non-steroid based medication for inflammation prescribed by Dr. Kennedy, his allergist. Patient uses Eucrisa daily, alternating with triamcinolone nightly for 2 weeks, followed by a 1-week break. During steroid treatment weeks, he applies Eucrisa in the morning and triamcinolone at night. During the off-week, Eucrisa is applied twice daily.    The eczema primarily affects the patient's wrist, hand, and neck, with the wrist and hand being most affected. The condition has significantly impacted his quality of life, affecting his ability to  objects and causing a loss of fingerprints. Patient's skin is scaly with a loss of sensation in his fingertips.    Patient, now 19 years old, discusses mental health concerns related to his skin condition. He reports fluctuations in his mental health, with the eczema significantly impacting his self-esteem and self-confidence. Patient feels self-conscious about physical contact due to his hand condition, affecting his perception of relationships and overall self-image. These feelings have impacted other areas of life, including work, hobbies, and academics.    Patient works in the produce department of a grocery store and takes precautions such as wearing gloves and using moisturizers and emollients at work. A week-long break from work during a trip to [employer] in [place] in October still resulted in dry and itchy hands, suggesting that workplace factors may not be the primary trigger for his condition.    Patient is considering seeking therapy to address feelings of loneliness and  to better manage his emotional response to his chronic condition. He acknowledges having more support than initially realized and expresses a desire to take action based on his self-awareness.             Subjective    PAST MEDICAL HISTORY:  Past Medical History:   Diagnosis Date    ADHD (attention deficit hyperactivity disorder)     Allergy     Anxiety     Asthma with exacerbation     Autism spectrum disorder, level one 04/02/2015    Depression     Eczema     History of psychiatric care     Obesity     Oppositional defiant disorder of childhood or adolescence 07/05/2012    Psychiatric problem     Severe obesity due to excess calories without serious comorbidity with body mass index (BMI) greater than 99th percentile for age in pediatric patient 08/11/2014    Therapy        PAST SURGICAL HISTORY:  Past Surgical History:   Procedure Laterality Date    CIRCUMCISION, PRIMARY      WISDOM TOOTH EXTRACTION Bilateral        SOCIAL HISTORY:  Social History     Socioeconomic History    Marital status: Single   Tobacco Use    Smoking status: Never     Passive exposure: Past    Smokeless tobacco: Never   Substance and Sexual Activity    Alcohol use: No    Drug use: No    Sexual activity: Never   Other Topics Concern    Caffeine Use No    Legal: Involved in criminal litigation No    Financial Status: Other Yes    Leisure: Movie Watching Yes    Childhood History: Raised by parents Yes    Leisure: Sports Yes    Childhood History: Other Yes    Leisure: Time with family Yes    Home situation: lives with family Yes    Patient has had a drink/used drugs as an eye opener in the AM No   Social History Narrative    Lives at home with mom and dad. Four dogs, two cat.        No smoking in house.        Completed 7th grade.       FAMILY HISTORY:  Family History   Problem Relation Name Age of Onset    Allergies Mother Aixa     Kidney cancer Mother Aixa     Fuch's dystrophy Mother Aixa     Hypertension Father Bartolo     Breast cancer  Maternal Aunt Cortez     Thyroid cancer Maternal Aunt Cortez     Amblyopia Maternal Uncle      Allergies Maternal Grandmother      Other Maternal Grandmother      Hypertension Maternal Grandmother      Stroke Maternal Grandmother      Cataracts Maternal Grandmother      Asthma Maternal Grandfather      Other Maternal Grandfather      COPD Maternal Grandfather      Heart disease Maternal Grandfather      Glaucoma Maternal Grandfather      Diabetes Paternal Grandmother      Heart disease Paternal Grandmother      Blindness Neg Hx      Macular degeneration Neg Hx      Retinal detachment Neg Hx      Strabismus Neg Hx      Allergic rhinitis Neg Hx      Angioedema Neg Hx      Atopy Neg Hx      Immunodeficiency Neg Hx      Eczema Neg Hx      Rhinitis Neg Hx      Urticaria Neg Hx         ALLERGIES AND MEDICATIONS: updated and reviewed.  Review of patient's allergies indicates:   Allergen Reactions    Singulair  [montelukast]      Other reaction(s): weird behavioral issues     Medication List with Changes/Refills   Current Medications    ALBUTEROL (PROVENTIL/VENTOLIN HFA) 90 MCG/ACTUATION INHALER    Inhale 1 puff into the lungs every 6 (six) hours as needed for Wheezing or Shortness of Breath.    AZELASTINE 205.5 MCG (0.15 %) SPRY    2 sprays by Each Nostril route once daily.     CRISABOROLE (EUCRISA) 2 % OINT    Apply twice daily as needed to areas of eczema flares.    FLUOCINONIDE (LIDEX) 0.05 % EXTERNAL SOLUTION    AAA scalp qday - bid prn pruritus    FLUTICASONE PROPION-SALMETEROL 113 MCG-14 MCG/ACTUATION AEBS    Inhale 1 puff into the lungs 2 (two) times daily.    FLUTICASONE PROPIONATE (FLONASE) 50 MCG/ACTUATION NASAL SPRAY    1 spray by Each Nostril route once daily.    KETOCONAZOLE (NIZORAL) 2 % SHAMPOO    Wash hair with medicated shampoo at least 2x/week - let sit on scalp at least 5 minutes prior to rinsing    TRIAMCINOLONE ACETONIDE 0.1% (KENALOG) 0.1 % OINTMENT    Apply twice daily as needed to eczema flares for up  "to 10 days at a time. Avoid use on face.   Changed and/or Refilled Medications    Modified Medication Previous Medication    HYDROCORTISONE 2.5 % CREAM hydrocortisone 2.5 % cream       Apply topically 2 (two) times daily. Apply twice daily as needed to eczema flares for up to 10 days at a time.    Apply topically 2 (two) times daily. Apply twice daily as needed to eczema flares for up to 10 days at a time.   Discontinued Medications    TRIAMCINOLONE ACETONIDE 0.1% (KENALOG) 0.1 % CREAM    Apply twice daily as needed to eczema flares for up to 10 days at a time. Avoid use on face.         CARE TEAM:  Patient Care Team:  Radha Mays MD as PCP - General (Internal Medicine)  Светлана Almeida LPN as Licensed Practical Nurse         SCREENING HISTORY:  Health Maintenance         Date Due Completion Date    Hepatitis C Screening Never done ---    Pneumococcal Vaccines (Age 0-49) (1 of 2 - PCV) 12/17/2023 1/3/2006    COVID-19 Vaccine (1 - 2024-25 season) Never done ---    TETANUS VACCINE 01/26/2027 1/26/2017    RSV Vaccine (Age 60+ and Pregnant patients) (1 - 1-dose 75+ series) 12/17/2079 ---              REVIEW OF SYSTEMS:   The patient reports : fair dietary habits.  The patient reports  : that they do not exercise regularly, but stay active.  Review of Systems   Constitutional:  Negative for chills and fever.   Respiratory:  Negative for shortness of breath.    Cardiovascular:  Negative for chest pain.   Gastrointestinal:  Negative for abdominal pain.   Genitourinary:  Negative for dysuria.   Musculoskeletal:  Negative for arthralgias.   Integumentary:  Positive for rash.   Psychiatric/Behavioral:  Positive for dysphoric mood.       ROS : patient denies: difficulty initiating urination          Objective    PHYSICAL EXAMINATION/VITALS:  Vitals:    01/08/25 0824   BP: 136/64   Pulse: 74   Temp: 98 °F (36.7 °C)   TempSrc: Oral   SpO2: 97%   Weight: 98.1 kg (216 lb 2.6 oz)   Height: 5' 9" (1.753 m)       Body " mass index is 31.92 kg/m².    General appearance - alert, well appearing, and in no distress, obese  Psychiatric - alert, oriented to person, place, and time, normal behavior, speech, dress, motor activity and thought process, he expresses very good insight regarding physical and mental well being  Eyes - pupils equal and reactive, extraocular eye movements intact, sclera anicteric  Neck - supple, no significant adenopathy, carotids upstroke normal bilaterally, no bruits  Lymphatics - no palpable cervical lymphadenopathy  Chest - clear to auscultation, no wheezes, rales or rhonchi, symmetric air entry  Heart - normal rate and regular rhythm  Neurological - alert, normal speech, no focal findings or movement disorder noted; cranial nerves II through XII intact  Musculoskeletal - no joint tenderness, deformity or swelling, no muscular tenderness noted  Extremities - no pedal edema noted  Skin - normal coloration, no suspicious skin lesions, dermatitis noted: eczematoid dermatitis on hands/wrist mostly       LABS:  No labs needed at this time          ASSESSMENT AND PLAN:   1. Routine medical exam  Counseled on age appropriate medical preventative services including age appropriate cancer screenings, age appropriate eye and dental exams, over all nutritional health, need for a consistent exercise regimen, and an over all push towards maintaining a vigorous and active lifestyle.  Counseled on age appropriate vaccines and discussed upcoming health care needs based on age/gender. Discussed good sleep hygiene and stress management.    2. Eczema, unspecified type  The current medical regimen is effective;  continue present plan and medications.   Followed by: Allergy/immunology.     3. Autism spectrum disorder, level one/4. Attention deficit hyperactivity disorder (ADHD), combined type  Overall doing well.  He has some decrease in mood at times related to his eczema.  He has good insight to both his physical and mental  well-being.  He is considering seeking some counseling to help him work through some of his concerns.  No need for further intervention or prescription medication at this time.    5. Mild intermittent asthma without complication  The current medical regimen is effective;  continue present plan and medications.   Followed by: Pulmonology.   Overview:  Followed at the [facility] in [place], [place]  - on immunotherapy      6. Overweight (BMI 25.0-29.9)  BMI Readings from Last 3 Encounters:   01/08/25 31.92 kg/m²   11/07/24 29.85 kg/m² (94%, Z= 1.55)*   05/02/24 29.27 kg/m² (94%, Z= 1.53)*     * Growth percentiles are based on CDC (Boys, 2-20 Years) data.     The patient is asked to make an attempt to improve diet and exercise patterns to aid in medical management of this problem.    7. Need for Streptococcus pneumoniae vaccination/8. Flu vaccine need/9. Need for COVID-19 vaccine  Immunizations declined.                 PATIENT EDUCATION:  Discussed importance of stress management in controlling eczema symptoms  Explained relationship between diet and weight management, emphasizing portion control over exercise for weight loss      ACTION ITEMS/LIFESTYLE:  Continue current skincare regimen with Eucrisa and topical steroids as prescribed  Consider seeking therapy to address mental health concerns and feelings of loneliness  Resume taking vitamins  Implement portion control for weight management  Continue joining friends at a new gym location for exercise          No orders of the defined types were placed in this encounter.      FOLLOW UP: Follow up in about 1 year (around 1/8/2026), or if symptoms worsen or fail to improve, for annual exam. or sooner as needed.    This note was generated with the assistance of ambient listening technology. Verbal consent was obtained by the patient and accompanying visitor(s) for the recording of patient appointment to facilitate this note. I attest to having  reviewed and edited the generated note for accuracy, though some syntax or spelling errors may persist. Please contact the author of this note for any clarification.

## 2025-01-09 ENCOUNTER — OFFICE VISIT (OUTPATIENT)
Dept: ALLERGY | Facility: CLINIC | Age: 20
End: 2025-01-09
Payer: COMMERCIAL

## 2025-01-09 VITALS — HEIGHT: 69 IN | WEIGHT: 212.94 LBS | BODY MASS INDEX: 31.54 KG/M2

## 2025-01-09 DIAGNOSIS — J45.909 ASTHMA, UNSPECIFIED ASTHMA SEVERITY, UNSPECIFIED WHETHER COMPLICATED, UNSPECIFIED WHETHER PERSISTENT: ICD-10-CM

## 2025-01-09 DIAGNOSIS — J30.9 ALLERGIC RHINITIS, UNSPECIFIED SEASONALITY, UNSPECIFIED TRIGGER: ICD-10-CM

## 2025-01-09 DIAGNOSIS — L30.9 HAND DERMATITIS: ICD-10-CM

## 2025-01-09 DIAGNOSIS — L20.9 ATOPIC DERMATITIS, UNSPECIFIED TYPE: Primary | ICD-10-CM

## 2025-01-09 PROCEDURE — 99999 PR PBB SHADOW E&M-EST. PATIENT-LVL III: CPT | Mod: PBBFAC,,, | Performed by: STUDENT IN AN ORGANIZED HEALTH CARE EDUCATION/TRAINING PROGRAM

## 2025-01-09 NOTE — PROGRESS NOTES
ALLERGY & IMMUNOLOGY CLINIC - FOLLOW UP     HISTORY OF PRESENT ILLNESS     Patient ID: Paulie Espinal is a 19 y.o. male    CC: atopic dermatitis, asthma     HPI: Paulie Espinal is a 19 y.o. male with a history of autism and allergic rhinitis, following up for asthma and atopic dermatitis.     He says AD doing better. He has found a good regimen between the topical steroid and eucrisa. Says the eucrisa stings when he tries to put it on his face. He was just prescribed hydrocortisone 2.5% cream for the face.   On his hands, he uses eucrisa + topical steroid for a week, then eucrisa alone for a week, and alternates between weeks.  He is requesting patch testing.   He says symptoms are currently manageable, so wants to hold off on the dupixent for now.     He says asthma is doing fine. He says he had to use his rescue inhaler twice in the night in December due to URI, but other than that doing well. Hasn't needed albuterol ion the past week.  He is using airduo once daily.     Allergies about the same, says he still uses benadryl prn, but rarely needs it.      MEDICAL HISTORY     Vaccines:    Immunization History   Administered Date(s) Administered    DTaP 02/21/2005, 04/28/2005, 06/30/2005, 03/21/2006, 11/04/2009    HIB 02/21/2005, 04/28/2005, 06/30/2005, 01/03/2006    HPV 9-Valent 01/26/2017, 09/01/2017    Hepatitis A, Pediatric/Adolescent, 2 Dose 05/27/2015, 01/26/2017    Hepatitis B 01/04/2005, 02/21/2005, 06/30/2005    IPV 02/21/2005, 04/28/2005, 06/30/2005, 03/21/2006, 11/04/2009    Influenza 11/03/2005, 11/17/2006, 11/12/2007, 11/05/2008, 12/10/2009    Influenza - Quadrivalent 10/21/2017    Influenza - Quadrivalent - PF *Preferred* (6 months and older) 12/02/2015, 01/26/2017, 10/21/2017, 09/25/2019, 09/21/2020    MMR 01/03/2006, 11/04/2009    Meningococcal Conjugate (MCV4P) 05/27/2016, 08/03/2021    Pneumococcal Conjugate - 7 Valent 02/21/2005, 04/28/2005, 06/30/2005, 01/03/2006    Tdap 01/26/2017    Varicella  06/21/2006, 11/04/2009     Medical Hx:   Patient Active Problem List   Diagnosis    Mild intermittent asthma without complication    Autism spectrum disorder, level one    Attention deficit hyperactivity disorder (ADHD), combined type    Overweight (BMI 25.0-29.9)    Eczema     Surgical Hx:   Past Surgical History:   Procedure Laterality Date    CIRCUMCISION, PRIMARY      WISDOM TOOTH EXTRACTION Bilateral      Medications:   Current Outpatient Medications on File Prior to Visit   Medication Sig Dispense Refill    albuterol (PROVENTIL/VENTOLIN HFA) 90 mcg/actuation inhaler Inhale 1 puff into the lungs every 6 (six) hours as needed for Wheezing or Shortness of Breath. 18 g 2    azelastine 205.5 mcg (0.15 %) Spry 2 sprays by Each Nostril route once daily.      crisaborole (EUCRISA) 2 % Oint Apply twice daily as needed to areas of eczema flares. 60 g 5    fluocinonide (LIDEX) 0.05 % external solution AAA scalp qday - bid prn pruritus 60 mL 3    fluticasone propion-salmeteroL 113 mcg-14 mcg/actuation aebs Inhale 1 puff into the lungs 2 (two) times daily. 1 each 11    fluticasone propionate (FLONASE) 50 mcg/actuation nasal spray 1 spray by Each Nostril route once daily.      hydrocortisone 2.5 % cream Apply topically 2 (two) times daily. Apply twice daily as needed to eczema flares for up to 10 days at a time. 28 g 3    ketoconazole (NIZORAL) 2 % shampoo Wash hair with medicated shampoo at least 2x/week - let sit on scalp at least 5 minutes prior to rinsing 120 mL 5    triamcinolone acetonide 0.1% (KENALOG) 0.1 % ointment Apply twice daily as needed to eczema flares for up to 10 days at a time. Avoid use on face. 80 g 5     No current facility-administered medications on file prior to visit.     Drug Allergies:   Review of patient's allergies indicates:   Allergen Reactions    Singulair  [montelukast]      Other reaction(s): weird behavioral issues     Social Hx:   Social History     Tobacco Use    Smoking status: Never  "    Passive exposure: Never    Smokeless tobacco: Never   Substance Use Topics    Alcohol use: No    Drug use: No     Additional History Obtained at Initial Visit:  H/o Asthma: endorses  H/o Rhinitis: endorses  Env/Occ:   Pets: 5 dogs and 3 cats. They used to trigger symptoms (prior to allergy shots), but now they don't   Occupation: he works as a  in a grocery store. He is in senior year at [email or web address], plans on studying accounting (plans on staying in [place]).  Infection Hx: Denies frequent infections requiring antibiotics.      PHYSICAL EXAM     VS: Ht 5' 9" (1.753 m)   Wt 96.6 kg (212 lb 15.4 oz)   BMI 31.45 kg/m²   GENERAL: Alert, NAD, well-appearing  EYES: EOMI, no conjunctival injection, no discharge, no infraorbital shiners  NOSE: NT 2-3+ B/L, + dried mucus in B/L nares, no polyps visualized  ORAL: MMM, no ulcers, no thrush  LUNGS: CTAB, no w/r/c, no increased WOB  HEART: RRR, normal S1/S2, no m/g/r  DERM: erythema and papules on forearms and palms of bilateral hands; patch of erythema and xerosis on right neck; patch of erythema on forehead  NEURO: normal speech, normal gait, no facial asymmetry     LABORATORY STUDIES     Component      Latest Ref Rng & Units 11/27/2021 5/30/2015 5/28/2015   WBC      4.50 - 13.50 K/uL 7.95 8.74 10.15   RBC      4.50 - 5.30 M/uL 5.39 (H) 4.90 5.09   Hemoglobin      13.0 - 16.0 g/dL 15.9 13.3 13.6   Hematocrit      37.0 - 47.0 % 50.2 (H) 40.1 40.0   MCV      78 - 98 fL 93 82 79   MCH      25.0 - 35.0 pg 29.5 27.1 26.7   MCHC      31.0 - 37.0 g/dL 31.7 33.2 34.0   RDW      11.5 - 14.5 % 12.9 13.5 13.6   Platelets      150 - 450 K/uL 250 329 85 (L)   MPV      9.2 - 12.9 fL 10.0 9.4 10.3   Immature Granulocytes      0.0 - 0.5 % 0.4     Gran # (ANC)      1.8 - 8.0 K/uL 4.3 4.3 4.5   Immature Grans (Abs)      0.00 - 0.04 K/uL 0.03     Lymph #      1.2 - 5.8 K/uL 2.6 2.5 3.5   Mono #      0.2 - 0.8 K/uL 0.8 0.9 (H) 1.0 (H)   Eos #      0.0 - 0.4 K/uL 0.2 1.0 (H) 1.1 " (H)   Baso #      0.01 - 0.05 K/uL 0.04 0.05 0.06   Differential Method       Automated Automated Automated   Sodium      136 - 145 mmol/L 140  139   Potassium      3.5 - 5.1 mmol/L 4.3  4.1   Chloride      95 - 110 mmol/L 103  108   CO2      23 - 29 mmol/L 27  21 (L)   Glucose      70 - 110 mg/dL 95  85   BUN      5 - 18 mg/dL 10  15   Creatinine      0.5 - 1.4 mg/dL 0.8  0.6   Calcium      8.7 - 10.5 mg/dL 10.2  9.7   PROTEIN TOTAL      6.0 - 8.4 g/dL 7.5  7.6   Albumin      3.2 - 4.7 g/dL 4.1  3.9   BILIRUBIN TOTAL      0.1 - 1.0 mg/dL 0.5  0.2   Alkaline Phosphatase      89 - 365 U/L 88 (L)  243   AST      10 - 40 U/L 14  24   ALT      10 - 44 U/L 25  32   Hemoglobin A1C External      4.0 - 5.6 % 5.1     TSH      0.400 - 5.000 uIU/mL   0.448   HIV 1/2 Ag/Ab      Negative Negative        ALLERGEN TESTING     Skin Prick:   Records previously requested, but not received.      PULMONARY FUNCTION TESTING     Date 11/20/23:  FVC:         108%ref -> - 1%  FEV1:         105%ref -> + 1%  FEV1/FVC: 83%  FEF 25-75: 99%ref  Interpretation: Spirometry is normal. Spirometry remains unimproved following bronchodilator.      IMAGING & OTHER DIAGNOSTICS     CXR 3/20/11:  Clinical history: Shortness of breath   The heart and mediastinum structures are within normal limits.   Prominent lung markings are seen at the parahilar regions bilaterally.     There is no evidence of focal infiltration.  There is no evidence of   pleural effusion or pneumothorax.  The bones appear intact.   Impression: Bronchitis suspected.      ASSESSMENT & PLAN     Paulie Espinal is a 19 y.o. male with     # Atopic dermatitis; hand dermatitis: At visit in 3/2024, symptoms were severe and poorly controlled. He had improvement with increasing use of his triamcinolone, but still wasn't optimally controlled. Since adding eucrisa, he reports significant improvement (although symptoms still moderate).   -continue triamcinolone 0.1% cream BID prn for body (he  prefers cream over ointment).  -agree with starting hydrocortisone 2.5% cream BID prn for face.  -continue eucrisa ointment BID prn.  -continue frequent moisturization, and continue to moisturize hands after washing.   -patient wants to hold off on dupixent for now given that symptoms are somewhat improved, but would reconsider if symptoms worsen.  -patient wondering if contact dermatitis might be contributing to symptoms and he is requesting patch testing. Referral to dermatology placed for consideration of patch testing.     # Asthma: Symptoms under good control, and he hasn't needed albuterol recently (other than during URI). He didn't tolerate montelukast as a child (behavioral issus), so will continue to avoid. Spirometry was normal (on airduo).  -continue generic airduo 113-14 mcg 1 puff daily (prescribed BID but he reports good control with once daily use).  -continue albuterol prn.    # Allergic rhinitis: He was on allergen immunotherapy injections for about 6 years (~2828-4124), prescribed through his allergist in [place], but given to him by his mother at home. He found AIT helpful (symptoms now more intermittent and easy to control). He is no longer using flonase or azelastine nasal sprays. Currently using benadryl prn (but says he rarely needs it).  -again recommend that instead of benadryl, he can take cetirizine 10-20 mg prn.      Follow up: 6 months or sooner if needed    Ciara Kennedy MD  Allergy/Immunology

## 2025-01-28 ENCOUNTER — TELEPHONE (OUTPATIENT)
Dept: DERMATOLOGY | Facility: CLINIC | Age: 20
End: 2025-01-28
Payer: COMMERCIAL

## 2025-01-31 ENCOUNTER — TELEPHONE (OUTPATIENT)
Dept: DERMATOLOGY | Facility: CLINIC | Age: 20
End: 2025-01-31
Payer: COMMERCIAL

## 2025-02-04 ENCOUNTER — TELEPHONE (OUTPATIENT)
Dept: DERMATOLOGY | Facility: CLINIC | Age: 20
End: 2025-02-04
Payer: COMMERCIAL

## 2025-03-31 RX ORDER — TRIAMCINOLONE ACETONIDE 1 MG/G
CREAM TOPICAL
Qty: 80 G | Refills: 5 | Status: SHIPPED | OUTPATIENT
Start: 2025-03-31

## 2025-03-31 NOTE — TELEPHONE ENCOUNTER
Please see the attached refill request.  LOV 01/09/2025 to follow up in June 2025.  apptmnt not set up yet.

## 2025-06-27 ENCOUNTER — TELEPHONE (OUTPATIENT)
Dept: DERMATOLOGY | Facility: CLINIC | Age: 20
End: 2025-06-27
Payer: COMMERCIAL

## 2025-06-30 ENCOUNTER — OFFICE VISIT (OUTPATIENT)
Dept: DERMATOLOGY | Facility: CLINIC | Age: 20
End: 2025-06-30
Payer: COMMERCIAL

## 2025-06-30 DIAGNOSIS — L20.9 ATOPIC DERMATITIS, UNSPECIFIED TYPE: ICD-10-CM

## 2025-06-30 DIAGNOSIS — L30.9 HAND DERMATITIS: ICD-10-CM

## 2025-06-30 DIAGNOSIS — D48.5 NEOPLASM OF UNCERTAIN BEHAVIOR OF SKIN: Primary | ICD-10-CM

## 2025-06-30 DIAGNOSIS — L21.9 SEBORRHEIC DERMATITIS: ICD-10-CM

## 2025-06-30 PROCEDURE — 1160F RVW MEDS BY RX/DR IN RCRD: CPT | Mod: CPTII,S$GLB,, | Performed by: STUDENT IN AN ORGANIZED HEALTH CARE EDUCATION/TRAINING PROGRAM

## 2025-06-30 PROCEDURE — 99999 PR PBB SHADOW E&M-EST. PATIENT-LVL III: CPT | Mod: PBBFAC,,, | Performed by: STUDENT IN AN ORGANIZED HEALTH CARE EDUCATION/TRAINING PROGRAM

## 2025-06-30 PROCEDURE — 11104 PUNCH BX SKIN SINGLE LESION: CPT | Mod: S$GLB,,, | Performed by: STUDENT IN AN ORGANIZED HEALTH CARE EDUCATION/TRAINING PROGRAM

## 2025-06-30 PROCEDURE — 99204 OFFICE O/P NEW MOD 45 MIN: CPT | Mod: 25,S$GLB,, | Performed by: STUDENT IN AN ORGANIZED HEALTH CARE EDUCATION/TRAINING PROGRAM

## 2025-06-30 PROCEDURE — 1159F MED LIST DOCD IN RCRD: CPT | Mod: CPTII,S$GLB,, | Performed by: STUDENT IN AN ORGANIZED HEALTH CARE EDUCATION/TRAINING PROGRAM

## 2025-06-30 RX ORDER — KETOCONAZOLE 20 MG/ML
SHAMPOO, SUSPENSION TOPICAL
Qty: 240 ML | Refills: 10 | Status: SHIPPED | OUTPATIENT
Start: 2025-06-30

## 2025-06-30 RX ORDER — BETAMETHASONE DIPROPIONATE 0.5 MG/G
CREAM TOPICAL 2 TIMES DAILY
Qty: 90 G | Refills: 3 | Status: SHIPPED | OUTPATIENT
Start: 2025-06-30

## 2025-06-30 RX ORDER — MOMETASONE FUROATE 1 MG/ML
LOTION TOPICAL DAILY
Qty: 60 ML | Refills: 3 | Status: SHIPPED | OUTPATIENT
Start: 2025-06-30

## 2025-06-30 RX ORDER — RUXOLITINIB 15 MG/G
1 CREAM TOPICAL 2 TIMES DAILY
Qty: 60 G | Refills: 10 | Status: ACTIVE | OUTPATIENT
Start: 2025-06-30

## 2025-06-30 NOTE — PATIENT INSTRUCTIONS

## 2025-06-30 NOTE — PROGRESS NOTES
Subjective:      Patient ID:  Paulie Espinal is a 19 y.o. male who presents for No chief complaint on file.    Pt here for eczema and possible patch testing     Pt has had eczema on extremities since early childhood- most recent flare on hands.    Pt currently using hydrocortisone, eucrisa, and kenalog cream PRN. One week eucrisa and next week kenalog.     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           Has had dermatitis since 6//2023. Hands, face, neck    H/o mild childhood atopic  H/o seb derm    Uses HC 2.5 for face, TAC for body, eucrisa for steroid sparing agent  Managed by allergy    Review of Systems    Objective:   Physical Exam   Constitutional: He appears well-developed and well-nourished. No distress.   Neurological: He is alert and oriented to person, place, and time. He is not disoriented.   Psychiatric: He has a normal mood and affect.   Skin:   Areas Examined (abnormalities noted in diagram):   RUE Inspected  LUE Inspection Performed  RLE Inspected  LLE Inspection Performed  Nails and Digits Inspection Performed                 Diagram Legend     Erythematous scaling macule/papule c/w actinic keratosis       Vascular papule c/w angioma      Pigmented verrucoid papule/plaque c/w seborrheic keratosis      Yellow umbilicated papule c/w sebaceous hyperplasia      Irregularly shaped tan macule c/w lentigo     1-2 mm smooth white papules consistent with Milia      Movable subcutaneous cyst with punctum c/w epidermal inclusion cyst      Subcutaneous movable cyst c/w pilar cyst      Firm pink to brown papule c/w dermatofibroma      Pedunculated fleshy papule(s) c/w skin tag(s)      Evenly pigmented macule c/w junctional nevus     Mildly variegated pigmented, slightly irregular-bordered macule c/w mildly atypical nevus      Flesh colored to evenly pigmented papule c/w intradermal nevus       Pink pearly papule/plaque c/w basal cell carcinoma      Erythematous hyperkeratotic  cursted plaque c/w SCC      Surgical scar with no sign of skin cancer recurrence      Open and closed comedones      Inflammatory papules and pustules      Verrucoid papule consistent consistent with wart     Erythematous eczematous patches and plaques     Dystrophic onycholytic nail with subungual debris c/w onychomycosis     Umbilicated papule    Erythematous-base heme-crusted tan verrucoid plaque consistent with inflamed seborrheic keratosis     Erythematous Silvery Scaling Plaque c/w Psoriasis     See annotation            Assessment / Plan:      Pathology Orders:       Normal Orders This Visit    Specimen to Pathology, Dermatology     Questions:    Procedure Type: Dermatology and skin neoplasms    Number of Specimens: 1    ------------------------: -------------------------    Spec 1 Procedure: Punch Biopsy    Spec 1 Clinical Impression: nevus vs melanoma    Spec 1 Source: right temporal scalp    Clinical Information: see EPIC    Clinical History: see EPIC    Specimen Source: Skin    Release to patient: Immediate    Send normal result to authorizing provider's In Basket if patient is active on MyChart: Yes          Neoplasm of uncertain behavior of skin  Punch biopsy procedure note:  Punch biopsy performed after verbal consent obtained. Area marked and prepped with alcohol. Approximately 1cc of 1% lidocaine with epinephrine injected. 6 mm disposable punch used to remove lesion. Hemostasis obtained and biopsy site closed with 1 - 2 Prolene sutures. Wound care instructions reviewed with patient and handout given.    Hand dermatitis  Atopic dermatitis, unspecified type  -     ruxolitinib (OPZELURA) 1.5 % Crea; Apply 1 Application topically 2 (two) times daily. Non-steroid. Use to face for maintenance. Use to body and hands when taking a break from steroid  Dispense: 60 g; Refill: 10  -     betamethasone dipropionate 0.05 % cream; Apply topically 2 (two) times daily. Use to affected areas for up to 2 weeks then  take a 1 week break or decrease to 3 times weekly. Do not apply to groin or face. Use to hands and body  Dispense: 90 g; Refill: 3    Has failed HC, eucrcisa and TAC    Seborrheic dermatitis  -     mometasone (ELOCON) 0.1 % solution; Apply topically once daily. Use to affected areas for up to 2 weeks then take a 1 week break or decrease to 3 times weekly. Do not apply to groin or face. Use to scalp  Dispense: 60 mL; Refill: 3  -     ketoconazole (NIZORAL) 2 % shampoo; Apply topically 3 (three) times a week. Use to scalp. Leave on for 5 minutes then wash off  Dispense: 240 mL; Refill: 10    Can consider dupixent in the future if not controlled         Follow up in about 3 months (around 9/30/2025).

## 2025-07-10 ENCOUNTER — OFFICE VISIT (OUTPATIENT)
Facility: CLINIC | Age: 20
End: 2025-07-10
Payer: COMMERCIAL

## 2025-07-10 VITALS — BODY MASS INDEX: 33.88 KG/M2 | WEIGHT: 223.56 LBS | HEIGHT: 68 IN

## 2025-07-10 DIAGNOSIS — L20.9 ATOPIC DERMATITIS, UNSPECIFIED TYPE: Primary | ICD-10-CM

## 2025-07-10 DIAGNOSIS — J45.909 ASTHMA, UNSPECIFIED ASTHMA SEVERITY, UNSPECIFIED WHETHER COMPLICATED, UNSPECIFIED WHETHER PERSISTENT: ICD-10-CM

## 2025-07-10 DIAGNOSIS — J30.9 ALLERGIC RHINITIS, UNSPECIFIED SEASONALITY, UNSPECIFIED TRIGGER: ICD-10-CM

## 2025-07-10 DIAGNOSIS — L30.9 HAND DERMATITIS: ICD-10-CM

## 2025-07-10 PROCEDURE — 99999 PR PBB SHADOW E&M-EST. PATIENT-LVL III: CPT | Mod: PBBFAC,,, | Performed by: STUDENT IN AN ORGANIZED HEALTH CARE EDUCATION/TRAINING PROGRAM

## 2025-07-10 NOTE — PROGRESS NOTES
ALLERGY & IMMUNOLOGY CLINIC - FOLLOW UP     HISTORY OF PRESENT ILLNESS     Patient ID: Paulie Espinal is a 19 y.o. male    CC: atopic dermatitis, asthma     HPI: Paulie Espinal is a 19 y.o. male with a history of autism and allergic rhinitis, following up for asthma and atopic dermatitis.     He saw dermatology 6/30/25, and they prescribed opzelura.   He says eczema is about the same. He thought there was improvement, although now in flare up.    He says that with derm, they decided to stop triamcinolone, hydrocortisone, and eucrisa.   Instead, was instructed to start betamethasone for flares on body.  Ketoconazole for scalp.   Opzelura for the face and body (hasn't picked it up yet, but it has now been approved).  If this doesn't help enough, he thinks he might be open to trying dupixent.     Asthma doing well.   He is using airduo once per day.   He hasn't had to use his rescue inhaler recently.     He says sinues are bad this time of year, but this is expected.   He continues to take benadryl prn.   Allergies about the same, says he still uses benadryl prn, but rarely needs it.      MEDICAL HISTORY     Vaccines:    Immunization History   Administered Date(s) Administered    DTaP 02/21/2005, 04/28/2005, 06/30/2005, 03/21/2006, 11/04/2009    HIB 02/21/2005, 04/28/2005, 06/30/2005, 01/03/2006    HPV 9-Valent 01/26/2017, 09/01/2017    Hepatitis A, Pediatric/Adolescent, 2 Dose 05/27/2015, 01/26/2017    Hepatitis B 01/04/2005, 02/21/2005, 06/30/2005    IPV 02/21/2005, 04/28/2005, 06/30/2005, 03/21/2006, 11/04/2009    Influenza 11/03/2005, 11/17/2006, 11/12/2007, 11/05/2008, 12/10/2009    Influenza - Quadrivalent 10/21/2017    Influenza - Quadrivalent - PF *Preferred* (6 months and older) 12/02/2015, 01/26/2017, 10/21/2017, 09/25/2019, 09/21/2020    MMR 01/03/2006, 11/04/2009    Meningococcal Conjugate (MCV4P) 05/27/2016, 08/03/2021    Pneumococcal Conjugate - 7 Valent 02/21/2005, 04/28/2005, 06/30/2005, 01/03/2006     Tdap 01/26/2017    Varicella 06/21/2006, 11/04/2009     Medical Hx:   Patient Active Problem List   Diagnosis    Mild intermittent asthma without complication    Autism spectrum disorder, level one    Attention deficit hyperactivity disorder (ADHD), combined type    Overweight (BMI 25.0-29.9)    Eczema     Surgical Hx:   Past Surgical History:   Procedure Laterality Date    CIRCUMCISION, PRIMARY      WISDOM TOOTH EXTRACTION Bilateral      Medications:   Current Outpatient Medications on File Prior to Visit   Medication Sig Dispense Refill    albuterol (PROVENTIL/VENTOLIN HFA) 90 mcg/actuation inhaler Inhale 1 puff into the lungs every 6 (six) hours as needed for Wheezing or Shortness of Breath. 18 g 2    azelastine 205.5 mcg (0.15 %) Spry 2 sprays by Each Nostril route once daily.      betamethasone dipropionate 0.05 % cream Apply topically 2 (two) times daily. Use to affected areas for up to 2 weeks then take a 1 week break or decrease to 3 times weekly. Do not apply to groin or face. Use to hands and body 90 g 3    fluocinonide (LIDEX) 0.05 % external solution AAA scalp qday - bid prn pruritus 60 mL 3    fluticasone propion-salmeteroL 113 mcg-14 mcg/actuation aebs Inhale 1 puff into the lungs 2 (two) times daily. 1 each 11    ketoconazole (NIZORAL) 2 % shampoo Apply topically 3 (three) times a week. Use to scalp. Leave on for 5 minutes then wash off 240 mL 10    mometasone (ELOCON) 0.1 % solution Apply topically once daily. Use to affected areas for up to 2 weeks then take a 1 week break or decrease to 3 times weekly. Do not apply to groin or face. Use to scalp 60 mL 3    ruxolitinib (OPZELURA) 1.5 % Crea Apply 1 Application topically 2 (two) times daily. Non-steroid. Use to face for maintenance. Use to body and hands when taking a break from steroid 60 g 10    crisaborole (EUCRISA) 2 % Oint Apply twice daily as needed to areas of eczema flares. (Patient not taking: Reported on 7/10/2025) 60 g 5    fluticasone  "propionate (FLONASE) 50 mcg/actuation nasal spray 1 spray by Each Nostril route once daily. (Patient not taking: Reported on 7/10/2025)      hydrocortisone 2.5 % cream Apply topically 2 (two) times daily. Apply twice daily as needed to eczema flares for up to 10 days at a time. (Patient not taking: Reported on 7/10/2025) 28 g 3    triamcinolone acetonide 0.1% (KENALOG) 0.1 % cream APPLY TO AFFECTED AREA TWICE A DAY AS NEEDED **TO ECZEMA FLARES UP TO 10 DAYS AT A TIME. AVOID USE ON FACE** (Patient not taking: Reported on 7/10/2025) 80 g 5    triamcinolone acetonide 0.1% (KENALOG) 0.1 % ointment Apply twice daily as needed to eczema flares for up to 10 days at a time. Avoid use on face. (Patient not taking: Reported on 7/10/2025) 80 g 5     No current facility-administered medications on file prior to visit.     Drug Allergies:   Review of patient's allergies indicates:   Allergen Reactions    Singulair  [montelukast]      Other reaction(s): weird behavioral issues     Social Hx:   Social History     Tobacco Use    Smoking status: Never     Passive exposure: Never    Smokeless tobacco: Never   Substance Use Topics    Alcohol use: Yes     Comment: rarely    Drug use: No     Additional History Obtained at Initial Visit:  H/o Asthma: endorses  H/o Rhinitis: endorses  Env/Occ:   Pets: 5 dogs and 3 cats. They used to trigger symptoms (prior to allergy shots), but now they don't   Occupation: he works as a  in a grocery store. He is in senior year at [employer], plans on studying accounting (plans on staying in [place]).  Infection Hx: Denies frequent infections requiring antibiotics.      PHYSICAL EXAM     VS: Ht 5' 8" (1.727 m)   Wt 101.4 kg (223 lb 8.7 oz)   BMI 33.99 kg/m²   GENERAL: Alert, NAD, well-appearing  EYES: EOMI, no conjunctival injection, no discharge, no infraorbital shiners  NOSE: NT 3+ B/L, + clear mucus in B/L nares, no polyps visualized  ORAL: MMM, no ulcers, no thrush  LUNGS: CTAB, no " w/r/c, no increased WOB  HEART: RRR, normal S1/S2, no m/g/r  DERM: erythema and papules on forearms and antecubital fossas; patches of erythema and xerosis on abdomen and thighs; scaling and skin cracking on palms of hands bilaterally (especially at finger tips)     LABORATORY STUDIES     Component      Latest Ref Rng & Units 11/27/2021 5/30/2015 5/28/2015   WBC      4.50 - 13.50 K/uL 7.95 8.74 10.15   RBC      4.50 - 5.30 M/uL 5.39 (H) 4.90 5.09   Hemoglobin      13.0 - 16.0 g/dL 15.9 13.3 13.6   Hematocrit      37.0 - 47.0 % 50.2 (H) 40.1 40.0   MCV      78 - 98 fL 93 82 79   MCH      25.0 - 35.0 pg 29.5 27.1 26.7   MCHC      31.0 - 37.0 g/dL 31.7 33.2 34.0   RDW      11.5 - 14.5 % 12.9 13.5 13.6   Platelets      150 - 450 K/uL 250 329 85 (L)   MPV      9.2 - 12.9 fL 10.0 9.4 10.3   Immature Granulocytes      0.0 - 0.5 % 0.4     Gran # (ANC)      1.8 - 8.0 K/uL 4.3 4.3 4.5   Immature Grans (Abs)      0.00 - 0.04 K/uL 0.03     Lymph #      1.2 - 5.8 K/uL 2.6 2.5 3.5   Mono #      0.2 - 0.8 K/uL 0.8 0.9 (H) 1.0 (H)   Eos #      0.0 - 0.4 K/uL 0.2 1.0 (H) 1.1 (H)   Baso #      0.01 - 0.05 K/uL 0.04 0.05 0.06   Differential Method       Automated Automated Automated   Sodium      136 - 145 mmol/L 140  139   Potassium      3.5 - 5.1 mmol/L 4.3  4.1   Chloride      95 - 110 mmol/L 103  108   CO2      23 - 29 mmol/L 27  21 (L)   Glucose      70 - 110 mg/dL 95  85   BUN      5 - 18 mg/dL 10  15   Creatinine      0.5 - 1.4 mg/dL 0.8  0.6   Calcium      8.7 - 10.5 mg/dL 10.2  9.7   PROTEIN TOTAL      6.0 - 8.4 g/dL 7.5  7.6   Albumin      3.2 - 4.7 g/dL 4.1  3.9   BILIRUBIN TOTAL      0.1 - 1.0 mg/dL 0.5  0.2   Alkaline Phosphatase      89 - 365 U/L 88 (L)  243   AST      10 - 40 U/L 14  24   ALT      10 - 44 U/L 25  32   Hemoglobin A1C External      4.0 - 5.6 % 5.1     TSH      0.400 - 5.000 uIU/mL   0.448   HIV 1/2 Ag/Ab      Negative Negative        ALLERGEN TESTING     Skin Prick:   Records previously requested, but not  received.      PULMONARY FUNCTION TESTING     Date 11/20/23:  FVC:         108%ref -> - 1%  FEV1:         105%ref -> + 1%  FEV1/FVC: 83%  FEF 25-75: 99%ref  Interpretation: Spirometry is normal. Spirometry remains unimproved following bronchodilator.      IMAGING & OTHER DIAGNOSTICS     CXR 3/20/11:  Clinical history: Shortness of breath   The heart and mediastinum structures are within normal limits.   Prominent lung markings are seen at the parahilar regions bilaterally.     There is no evidence of focal infiltration.  There is no evidence of   pleural effusion or pneumothorax.  The bones appear intact.   Impression: Bronchitis suspected.      CHART REVIEW     Reviewed derm note.     ASSESSMENT & PLAN     Paulie Espinal is a 19 y.o. male with     # Atopic dermatitis; hand dermatitis: At visit in 3/2024, symptoms were severe and poorly controlled. He had some improvement with increasing use of his triamcinolone and starting eucrisa, but still wasn't optimally controlled. He saw dermatology at end of June. Now plan is to discontinue his triamcinolone, hydrocortisone, and eucrisa; with plan to start betamethasone for body and opzelura for face and body. Patient would be a good candidate for dupixent, which we discussed again today. Patient wants to try new regimen first, but he may then opt for dupixent if symptoms not significantly improved.     # Asthma: Symptoms under good control, and he hasn't needed albuterol recently. He didn't tolerate montelukast as a child (behavioral issus), so will continue to avoid. Spirometry was normal (on airduo).  -continue generic airduo 113-14 mcg 1 puff daily (prescribed BID but he reports good control with once daily use).  -continue albuterol prn.    # Allergic rhinitis: He was on allergen immunotherapy injections for about 6 years (~[id number]), prescribed through his allergist in [place], but given to him by his mother at home. He found AIT helpful (symptoms now more  intermittent and easy to control). He is no longer using flonase or azelastine nasal sprays. Currently using benadryl prn. Symptoms flaring recently (which he finds typical for this time of year).   -again recommend that instead of benadryl, he can take cetirizine 10-20 mg prn.      Follow up: 6 months or sooner if needed     I spent a total of 35 minutes on the day of the visit.  This includes face to face time and non-face to face time preparing to see the patient, obtaining and/or reviewing separately obtained history, documenting clinical information in the electronic or other health record.  Patient has chronic condition requiring long-term follow-up with me.    Ciara Kennedy MD  Allergy/Immunology

## 2025-07-18 ENCOUNTER — TELEPHONE (OUTPATIENT)
Dept: DERMATOLOGY | Facility: CLINIC | Age: 20
End: 2025-07-18
Payer: COMMERCIAL

## 2025-07-18 DIAGNOSIS — D23.9 DYSPLASTIC NEVUS: Primary | ICD-10-CM

## 2025-07-18 NOTE — TELEPHONE ENCOUNTER
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 Re-excision is recommended. You will be contacted by your providers office.       Path result as above. Given moderately atypical and pathologist recommended re-excision, will refer to ENT for conservative re-excision    I called the patietn and informed him of the resutl and plan. He voiced understanding    Annual tbse